# Patient Record
Sex: MALE | Race: WHITE | Employment: FULL TIME | URBAN - METROPOLITAN AREA
[De-identification: names, ages, dates, MRNs, and addresses within clinical notes are randomized per-mention and may not be internally consistent; named-entity substitution may affect disease eponyms.]

---

## 2017-05-09 ENCOUNTER — APPOINTMENT (OUTPATIENT)
Dept: CT IMAGING | Facility: HOSPITAL | Age: 47
End: 2017-05-09
Attending: EMERGENCY MEDICINE
Payer: COMMERCIAL

## 2017-05-09 ENCOUNTER — HOSPITAL ENCOUNTER (EMERGENCY)
Facility: HOSPITAL | Age: 47
Discharge: HOME | End: 2017-05-09
Attending: EMERGENCY MEDICINE
Payer: COMMERCIAL

## 2017-05-09 ENCOUNTER — APPOINTMENT (OUTPATIENT)
Dept: GENERAL RADIOLOGY | Facility: HOSPITAL | Age: 47
End: 2017-05-09
Attending: EMERGENCY MEDICINE
Payer: COMMERCIAL

## 2017-05-09 VITALS
SYSTOLIC BLOOD PRESSURE: 103 MMHG | TEMPERATURE: 98 F | HEART RATE: 96 BPM | WEIGHT: 200 LBS | RESPIRATION RATE: 18 BRPM | BODY MASS INDEX: 30 KG/M2 | DIASTOLIC BLOOD PRESSURE: 62 MMHG | OXYGEN SATURATION: 98 %

## 2017-05-09 DIAGNOSIS — R55 SYNCOPE AND COLLAPSE: Primary | ICD-10-CM

## 2017-05-09 DIAGNOSIS — I69.398 CVA, OLD, DISTURBANCES OF VISION: ICD-10-CM

## 2017-05-09 DIAGNOSIS — H53.9 CVA, OLD, DISTURBANCES OF VISION: ICD-10-CM

## 2017-05-09 DIAGNOSIS — S02.2XXA CLOSED FRACTURE OF NASAL BONE, INITIAL ENCOUNTER: ICD-10-CM

## 2017-05-09 PROCEDURE — 96372 THER/PROPH/DIAG INJ SC/IM: CPT

## 2017-05-09 PROCEDURE — 99285 EMERGENCY DEPT VISIT HI MDM: CPT

## 2017-05-09 PROCEDURE — 70486 CT MAXILLOFACIAL W/O DYE: CPT | Performed by: EMERGENCY MEDICINE

## 2017-05-09 PROCEDURE — 71020 XR CHEST PA + LAT CHEST (CPT=71020): CPT | Performed by: EMERGENCY MEDICINE

## 2017-05-09 PROCEDURE — 80053 COMPREHEN METABOLIC PANEL: CPT | Performed by: EMERGENCY MEDICINE

## 2017-05-09 PROCEDURE — 93005 ELECTROCARDIOGRAM TRACING: CPT

## 2017-05-09 PROCEDURE — 21310 PR CLOSED TX NASAL BONE FX WITHOUT MANIPULATION: CPT

## 2017-05-09 PROCEDURE — 72170 X-RAY EXAM OF PELVIS: CPT | Performed by: EMERGENCY MEDICINE

## 2017-05-09 PROCEDURE — 70450 CT HEAD/BRAIN W/O DYE: CPT | Performed by: EMERGENCY MEDICINE

## 2017-05-09 PROCEDURE — 93010 ELECTROCARDIOGRAM REPORT: CPT

## 2017-05-09 PROCEDURE — 73562 X-RAY EXAM OF KNEE 3: CPT | Performed by: EMERGENCY MEDICINE

## 2017-05-09 PROCEDURE — 73560 X-RAY EXAM OF KNEE 1 OR 2: CPT | Performed by: EMERGENCY MEDICINE

## 2017-05-09 PROCEDURE — 84484 ASSAY OF TROPONIN QUANT: CPT | Performed by: EMERGENCY MEDICINE

## 2017-05-09 PROCEDURE — 82962 GLUCOSE BLOOD TEST: CPT

## 2017-05-09 PROCEDURE — 21310 HC CLOSED TX NASAL BONE FX WITHOUT MANIPULATION: CPT

## 2017-05-09 PROCEDURE — 36415 COLL VENOUS BLD VENIPUNCTURE: CPT

## 2017-05-09 PROCEDURE — 76377 3D RENDER W/INTRP POSTPROCES: CPT | Performed by: EMERGENCY MEDICINE

## 2017-05-09 PROCEDURE — 85025 COMPLETE CBC W/AUTO DIFF WBC: CPT | Performed by: EMERGENCY MEDICINE

## 2017-05-09 RX ORDER — HYDROCODONE BITARTRATE AND ACETAMINOPHEN 5; 325 MG/1; MG/1
1 TABLET ORAL ONCE
Status: COMPLETED | OUTPATIENT
Start: 2017-05-09 | End: 2017-05-09

## 2017-05-09 RX ORDER — AMOXICILLIN AND CLAVULANATE POTASSIUM 875; 125 MG/1; MG/1
1 TABLET, FILM COATED ORAL 2 TIMES DAILY
Qty: 14 TABLET | Refills: 0 | Status: SHIPPED | OUTPATIENT
Start: 2017-05-09 | End: 2017-05-16

## 2017-05-09 RX ORDER — HYDROCODONE BITARTRATE AND ACETAMINOPHEN 5; 325 MG/1; MG/1
1-2 TABLET ORAL EVERY 6 HOURS PRN
Qty: 20 TABLET | Refills: 0 | Status: SHIPPED | OUTPATIENT
Start: 2017-05-09 | End: 2017-05-16

## 2017-05-09 NOTE — ED INITIAL ASSESSMENT (HPI)
46YM c/c of syncope Pt state he had two syncopal events tonight that started 0000 Pt state he fall and hit his face on during one events.  Pt wife state pt was \"out of it\" short after the event then got better

## 2017-05-09 NOTE — ED PROVIDER NOTES
Patient Seen in: [facility] Emergency Department    History   Patient presents with:  Syncope (cardiovascular, neurologic)  Facial Trauma    Stated Complaint: syncopal event, face inj    HPI    The patient is a 18-year-old male with multiple past me Solution Pen-injector,  INJECT 40 UNITS INTO THE SKIN DAILY. Amphetamine-Dextroamphet ER (ADDERALL XR) 10 MG Oral Capsule SR 24 Hr,  Take 1 capsule (10 mg total) by mouth daily.    Amphetamine-Dextroamphet ER (ADDERALL XR) 10 MG Oral Capsule SR 24 Hr,  Ta 0136 100   Resp 05/09/17 0136 20   Temp 05/09/17 0502 98 °F (36.7 °C)   Temp src 05/09/17 0502 Temporal   SpO2 05/09/17 0136 100 %   O2 Device 05/09/17 0136 None (Room air)       Current:/62 mmHg  Pulse 96  Temp(Src) 98 °F (36.7 °C) (Temporal)  Resp sensation is intact. Distal pulses normal and symmetric  Skin: No lacerations or abrasions. No masses or nodules or abnormalities.   Psych: Normal interaction, cooperative with exam    ED Course     Labs Reviewed   COMP METABOLIC PANEL (14) - Abnormal; No x-ray, left knee x-ray was obtained. The remainder of his exam was cleared clinically. Tetanus was updated.   MDM       XR chest, pelvis, left knee  IMPRESSION:  Chest:  -No evidence of pulmonary infiltrates or pleural effusions  -Mild enlargement of the department on telemetry and has been in normal sinus rhythm. His EKG, labs and chest x-ray show no acute process.   I believe he is stable for discharge with follow-up with his cardiologist.  He understands he should return if he develops worrisome symptom

## 2018-01-22 PROBLEM — Z79.4 TYPE 2 DIABETES MELLITUS WITHOUT COMPLICATION, WITH LONG-TERM CURRENT USE OF INSULIN (HCC): Status: ACTIVE | Noted: 2018-01-22

## 2018-01-22 PROBLEM — E78.1 HYPERTRIGLYCERIDEMIA: Status: ACTIVE | Noted: 2018-01-22

## 2018-01-22 PROBLEM — E11.9 TYPE 2 DIABETES MELLITUS WITHOUT COMPLICATION, WITH LONG-TERM CURRENT USE OF INSULIN (HCC): Status: ACTIVE | Noted: 2018-01-22

## 2018-03-31 PROCEDURE — 83721 ASSAY OF BLOOD LIPOPROTEIN: CPT | Performed by: INTERNAL MEDICINE

## 2018-05-24 PROCEDURE — 82140 ASSAY OF AMMONIA: CPT | Performed by: OTHER

## 2018-05-24 PROCEDURE — 36415 COLL VENOUS BLD VENIPUNCTURE: CPT | Performed by: OTHER

## 2018-05-24 PROCEDURE — 82607 VITAMIN B-12: CPT | Performed by: OTHER

## 2018-12-03 ENCOUNTER — HOSPITAL ENCOUNTER (OUTPATIENT)
Dept: GENERAL RADIOLOGY | Age: 48
Discharge: HOME | End: 2018-12-03
Attending: INTERNAL MEDICINE
Payer: COMMERCIAL

## 2018-12-03 DIAGNOSIS — R07.9 CHEST PAIN, UNSPECIFIED TYPE: ICD-10-CM

## 2018-12-03 PROCEDURE — 71046 X-RAY EXAM CHEST 2 VIEWS: CPT | Performed by: INTERNAL MEDICINE

## 2018-12-11 ENCOUNTER — APPOINTMENT (OUTPATIENT)
Dept: GENERAL RADIOLOGY | Facility: HOSPITAL | Age: 48
End: 2018-12-11
Payer: COMMERCIAL

## 2018-12-11 ENCOUNTER — APPOINTMENT (OUTPATIENT)
Dept: CT IMAGING | Facility: HOSPITAL | Age: 48
End: 2018-12-11
Attending: EMERGENCY MEDICINE
Payer: COMMERCIAL

## 2018-12-11 ENCOUNTER — HOSPITAL ENCOUNTER (OUTPATIENT)
Facility: HOSPITAL | Age: 48
Setting detail: OBSERVATION
Discharge: HOME | End: 2018-12-12
Attending: EMERGENCY MEDICINE | Admitting: INTERNAL MEDICINE
Payer: COMMERCIAL

## 2018-12-11 DIAGNOSIS — J18.9 COMMUNITY ACQUIRED PNEUMONIA, UNSPECIFIED LATERALITY: ICD-10-CM

## 2018-12-11 DIAGNOSIS — R00.0 TACHYCARDIA: ICD-10-CM

## 2018-12-11 DIAGNOSIS — R07.9 CHEST PAIN OF UNCERTAIN ETIOLOGY: Primary | ICD-10-CM

## 2018-12-11 PROCEDURE — 96365 THER/PROPH/DIAG IV INF INIT: CPT

## 2018-12-11 PROCEDURE — 83880 ASSAY OF NATRIURETIC PEPTIDE: CPT | Performed by: EMERGENCY MEDICINE

## 2018-12-11 PROCEDURE — 82962 GLUCOSE BLOOD TEST: CPT

## 2018-12-11 PROCEDURE — 99285 EMERGENCY DEPT VISIT HI MDM: CPT

## 2018-12-11 PROCEDURE — 71045 X-RAY EXAM CHEST 1 VIEW: CPT

## 2018-12-11 PROCEDURE — 96361 HYDRATE IV INFUSION ADD-ON: CPT

## 2018-12-11 PROCEDURE — 85025 COMPLETE CBC W/AUTO DIFF WBC: CPT

## 2018-12-11 PROCEDURE — 93010 ELECTROCARDIOGRAM REPORT: CPT

## 2018-12-11 PROCEDURE — 84484 ASSAY OF TROPONIN QUANT: CPT

## 2018-12-11 PROCEDURE — 83036 HEMOGLOBIN GLYCOSYLATED A1C: CPT | Performed by: HOSPITALIST

## 2018-12-11 PROCEDURE — 80053 COMPREHEN METABOLIC PANEL: CPT

## 2018-12-11 PROCEDURE — 71275 CT ANGIOGRAPHY CHEST: CPT | Performed by: EMERGENCY MEDICINE

## 2018-12-11 PROCEDURE — 96375 TX/PRO/DX INJ NEW DRUG ADDON: CPT

## 2018-12-11 PROCEDURE — 93005 ELECTROCARDIOGRAM TRACING: CPT

## 2018-12-11 PROCEDURE — 85378 FIBRIN DEGRADE SEMIQUANT: CPT | Performed by: EMERGENCY MEDICINE

## 2018-12-11 RX ORDER — BISACODYL 10 MG
10 SUPPOSITORY, RECTAL RECTAL
Status: DISCONTINUED | OUTPATIENT
Start: 2018-12-11 | End: 2018-12-12

## 2018-12-11 RX ORDER — FAMOTIDINE 20 MG
5000 TABLET ORAL DAILY
COMMUNITY

## 2018-12-11 RX ORDER — IBUPROFEN 400 MG/1
200 TABLET ORAL EVERY 4 HOURS PRN
Status: DISCONTINUED | OUTPATIENT
Start: 2018-12-11 | End: 2018-12-12

## 2018-12-11 RX ORDER — CEFUROXIME AXETIL 500 MG/1
500 TABLET ORAL 2 TIMES DAILY
Status: DISCONTINUED | OUTPATIENT
Start: 2018-12-11 | End: 2018-12-12

## 2018-12-11 RX ORDER — PREDNISONE 10 MG/1
40 TABLET ORAL SEE ADMIN INSTRUCTIONS
COMMUNITY
Start: 2018-12-10 | End: 2018-12-12

## 2018-12-11 RX ORDER — PANTOPRAZOLE SODIUM 40 MG/1
40 TABLET, DELAYED RELEASE ORAL
Status: DISCONTINUED | OUTPATIENT
Start: 2018-12-12 | End: 2018-12-12

## 2018-12-11 RX ORDER — ALBUTEROL SULFATE 90 UG/1
2 AEROSOL, METERED RESPIRATORY (INHALATION) EVERY 4 HOURS PRN
Status: DISCONTINUED | OUTPATIENT
Start: 2018-12-11 | End: 2018-12-12

## 2018-12-11 RX ORDER — ONDANSETRON 2 MG/ML
4 INJECTION INTRAMUSCULAR; INTRAVENOUS EVERY 6 HOURS PRN
Status: DISCONTINUED | OUTPATIENT
Start: 2018-12-11 | End: 2018-12-12

## 2018-12-11 RX ORDER — DEXTROAMPHETAMINE SACCHARATE, AMPHETAMINE ASPARTATE, DEXTROAMPHETAMINE SULFATE AND AMPHETAMINE SULFATE 1.25; 1.25; 1.25; 1.25 MG/1; MG/1; MG/1; MG/1
5 TABLET ORAL
COMMUNITY

## 2018-12-11 RX ORDER — DEXTROSE MONOHYDRATE 25 G/50ML
50 INJECTION, SOLUTION INTRAVENOUS
Status: DISCONTINUED | OUTPATIENT
Start: 2018-12-11 | End: 2018-12-12

## 2018-12-11 RX ORDER — LANSOPRAZOLE 30 MG/1
30 CAPSULE, DELAYED RELEASE ORAL
COMMUNITY
End: 2019-04-14

## 2018-12-11 RX ORDER — ENOXAPARIN SODIUM 100 MG/ML
40 INJECTION SUBCUTANEOUS NIGHTLY
Status: DISCONTINUED | OUTPATIENT
Start: 2018-12-11 | End: 2018-12-12

## 2018-12-11 RX ORDER — ACETAMINOPHEN 325 MG/1
650 TABLET ORAL EVERY 6 HOURS PRN
Status: DISCONTINUED | OUTPATIENT
Start: 2018-12-11 | End: 2018-12-12

## 2018-12-11 RX ORDER — METOCLOPRAMIDE HYDROCHLORIDE 5 MG/ML
10 INJECTION INTRAMUSCULAR; INTRAVENOUS EVERY 8 HOURS PRN
Status: DISCONTINUED | OUTPATIENT
Start: 2018-12-11 | End: 2018-12-12

## 2018-12-11 RX ORDER — GABAPENTIN 100 MG/1
200 CAPSULE ORAL 3 TIMES DAILY
Status: DISCONTINUED | OUTPATIENT
Start: 2018-12-11 | End: 2018-12-12

## 2018-12-11 RX ORDER — PREDNISONE 10 MG/1
30 TABLET ORAL SEE ADMIN INSTRUCTIONS
COMMUNITY
Start: 2018-12-13 | End: 2018-12-15

## 2018-12-11 RX ORDER — VENLAFAXINE HYDROCHLORIDE 150 MG/1
112.5 TABLET, EXTENDED RELEASE ORAL DAILY
COMMUNITY
End: 2019-07-01

## 2018-12-11 RX ORDER — POLYETHYLENE GLYCOL 3350 17 G/17G
17 POWDER, FOR SOLUTION ORAL DAILY PRN
Status: DISCONTINUED | OUTPATIENT
Start: 2018-12-11 | End: 2018-12-12

## 2018-12-11 RX ORDER — IBUPROFEN 400 MG/1
400 TABLET ORAL EVERY 4 HOURS PRN
Status: DISCONTINUED | OUTPATIENT
Start: 2018-12-11 | End: 2018-12-12

## 2018-12-11 RX ORDER — ROSUVASTATIN CALCIUM 5 MG/1
5 TABLET, COATED ORAL NIGHTLY
COMMUNITY
End: 2019-04-14

## 2018-12-11 RX ORDER — DOCUSATE SODIUM 100 MG/1
100 CAPSULE, LIQUID FILLED ORAL 2 TIMES DAILY
Status: DISCONTINUED | OUTPATIENT
Start: 2018-12-11 | End: 2018-12-12

## 2018-12-11 RX ORDER — PREDNISONE 10 MG/1
20 TABLET ORAL SEE ADMIN INSTRUCTIONS
COMMUNITY
Start: 2018-12-16 | End: 2018-12-18

## 2018-12-11 RX ORDER — PREDNISONE 10 MG/1
10 TABLET ORAL SEE ADMIN INSTRUCTIONS
COMMUNITY
Start: 2018-12-19 | End: 2018-12-21

## 2018-12-11 RX ORDER — OMEGA-3-ACID ETHYL ESTERS 1 G/1
2 CAPSULE, LIQUID FILLED ORAL 2 TIMES DAILY
COMMUNITY
End: 2019-04-12 | Stop reason: CLARIF

## 2018-12-11 RX ORDER — VENLAFAXINE HYDROCHLORIDE 37.5 MG/1
112.5 CAPSULE, EXTENDED RELEASE ORAL DAILY
COMMUNITY
End: 2019-04-12

## 2018-12-11 RX ORDER — SODIUM CHLORIDE 9 MG/ML
INJECTION, SOLUTION INTRAVENOUS CONTINUOUS
Status: ACTIVE | OUTPATIENT
Start: 2018-12-11 | End: 2018-12-11

## 2018-12-11 RX ORDER — GABAPENTIN 100 MG/1
200 CAPSULE ORAL 3 TIMES DAILY
COMMUNITY

## 2018-12-11 RX ORDER — SODIUM PHOSPHATE, DIBASIC AND SODIUM PHOSPHATE, MONOBASIC 7; 19 G/133ML; G/133ML
1 ENEMA RECTAL ONCE AS NEEDED
Status: DISCONTINUED | OUTPATIENT
Start: 2018-12-11 | End: 2018-12-12

## 2018-12-11 RX ORDER — IBUPROFEN 600 MG/1
600 TABLET ORAL EVERY 4 HOURS PRN
Status: DISCONTINUED | OUTPATIENT
Start: 2018-12-11 | End: 2018-12-12

## 2018-12-11 RX ORDER — ROSUVASTATIN CALCIUM 5 MG/1
5 TABLET, COATED ORAL NIGHTLY
Status: DISCONTINUED | OUTPATIENT
Start: 2018-12-11 | End: 2018-12-12

## 2018-12-11 NOTE — ED PROVIDER NOTES
Patient Seen in: [facility] Emergency Department    History   Patient presents with:  Chest Pain Angina (cardiovascular)  Dyspnea HERSON SOB (respiratory)    Stated Complaint: CHEST PAIN    HPI    43-year-old male presents with chest pain.   He reports vital signs reviewed. All other systems reviewed and negative except as noted above.     Physical Exam     ED Triage Vitals [12/11/18 1448]   /70   Pulse 114   Resp 20   Temp 98 °F (36.7 °C)   Temp src    SpO2 97 %   O2 Device None (Room air) approximately 95% when results are used as part of the total clinical evaluation of the patient. CBC WITH DIFFERENTIAL WITH PLATELET    Narrative: The following orders were created for panel order CBC WITH DIFFERENTIAL WITH PLATELET.   Procedure CT ANGIOGRAPHY, CHEST (CPT=71275)  COMPARISON:  [facility]HAEL , CT FACIAL BONES (CPT=70486), 5/09/2017, 3:17. MICHAEL , CT BRAIN OR HEAD (52627), 5/09/2017, 3:13.   INDICATIONS:  CHEST PAIN  TECHNIQUE:  IV contrast-enhanced multislice CT angiography is performed t right brachiocephalic artery measuring 1.9 cm. 4.  Cholelithiasis.     Dictated by: Grisel Stanton MD on 12/11/2018 at 16:53     Approved by: Grisel Stanton MD                              MDM   30-year-old male with history of previous aortic valve rep

## 2018-12-11 NOTE — ED INITIAL ASSESSMENT (HPI)
Pt here with c/o chest pain, tightness, HERSON, recent dx of PNA. Patient reports today upon wakening the chest pain was more severe, pain worsens on exertion with dyspnea. Patient went to urgent care, was sent over via 911.  Patient reports mild relief with 1

## 2018-12-12 ENCOUNTER — APPOINTMENT (OUTPATIENT)
Dept: CV DIAGNOSTICS | Facility: HOSPITAL | Age: 48
End: 2018-12-12
Attending: INTERNAL MEDICINE
Payer: COMMERCIAL

## 2018-12-12 VITALS
SYSTOLIC BLOOD PRESSURE: 105 MMHG | TEMPERATURE: 98 F | OXYGEN SATURATION: 100 % | HEART RATE: 88 BPM | BODY MASS INDEX: 30.36 KG/M2 | DIASTOLIC BLOOD PRESSURE: 78 MMHG | RESPIRATION RATE: 20 BRPM | HEIGHT: 68 IN | WEIGHT: 200.31 LBS

## 2018-12-12 PROCEDURE — 87633 RESP VIRUS 12-25 TARGETS: CPT | Performed by: INTERNAL MEDICINE

## 2018-12-12 PROCEDURE — 80048 BASIC METABOLIC PNL TOTAL CA: CPT | Performed by: INTERNAL MEDICINE

## 2018-12-12 PROCEDURE — 84145 PROCALCITONIN (PCT): CPT | Performed by: INTERNAL MEDICINE

## 2018-12-12 PROCEDURE — 87040 BLOOD CULTURE FOR BACTERIA: CPT | Performed by: INTERNAL MEDICINE

## 2018-12-12 PROCEDURE — 87999 UNLISTED MICROBIOLOGY PX: CPT

## 2018-12-12 PROCEDURE — 87486 CHLMYD PNEUM DNA AMP PROBE: CPT | Performed by: INTERNAL MEDICINE

## 2018-12-12 PROCEDURE — 87581 M.PNEUMON DNA AMP PROBE: CPT | Performed by: INTERNAL MEDICINE

## 2018-12-12 PROCEDURE — 93306 TTE W/DOPPLER COMPLETE: CPT | Performed by: INTERNAL MEDICINE

## 2018-12-12 PROCEDURE — 93018 CV STRESS TEST I&R ONLY: CPT | Performed by: INTERNAL MEDICINE

## 2018-12-12 PROCEDURE — 82962 GLUCOSE BLOOD TEST: CPT

## 2018-12-12 PROCEDURE — 84484 ASSAY OF TROPONIN QUANT: CPT | Performed by: INTERNAL MEDICINE

## 2018-12-12 PROCEDURE — 86140 C-REACTIVE PROTEIN: CPT | Performed by: INTERNAL MEDICINE

## 2018-12-12 PROCEDURE — 93017 CV STRESS TEST TRACING ONLY: CPT | Performed by: INTERNAL MEDICINE

## 2018-12-12 PROCEDURE — 78452 HT MUSCLE IMAGE SPECT MULT: CPT | Performed by: INTERNAL MEDICINE

## 2018-12-12 PROCEDURE — 85652 RBC SED RATE AUTOMATED: CPT | Performed by: INTERNAL MEDICINE

## 2018-12-12 PROCEDURE — 85025 COMPLETE CBC W/AUTO DIFF WBC: CPT | Performed by: INTERNAL MEDICINE

## 2018-12-12 PROCEDURE — 87798 DETECT AGENT NOS DNA AMP: CPT | Performed by: INTERNAL MEDICINE

## 2018-12-12 RX ORDER — CEFUROXIME AXETIL 500 MG/1
500 TABLET ORAL 2 TIMES DAILY
Qty: 20 TABLET | Refills: 0 | Status: SHIPPED | OUTPATIENT
Start: 2018-12-12 | End: 2018-12-22

## 2018-12-12 RX ORDER — POTASSIUM CHLORIDE 20 MEQ/1
40 TABLET, EXTENDED RELEASE ORAL EVERY 4 HOURS
Status: COMPLETED | OUTPATIENT
Start: 2018-12-12 | End: 2018-12-12

## 2018-12-12 NOTE — PLAN OF CARE
Assumed care of patient at 1100 which at this time he was down in stress lab.       1500: pt returned back to room from stress lab. RT swabbed for r/o flu.     1715: Dr. Shahid Dean notified me that stress test and echo were negative.  He was ok that the brandon

## 2018-12-12 NOTE — H&P
[facility] Hospitalist History and Physical      Patient presents with:  Chest Pain Angina (cardiovascular)  Dyspnea HERSON SOB (respiratory)     Pt seen in ER 12/11, note entered late      PCP: Fred Miller MD      History of Present Illness: Patient is a 53 year ol Normocephalic, without obvious abnormality, atraumatic. Eyes:  Sclera anicteric, No conjunctival pallor, EOMs intact. Nose: Nares normal. Septum midline. Mucosa normal. No drainage.    Throat: Lips, mucosa, and tongue normal. Teeth and gums normal.   N inspiration. 11 mm nodular opacity projected over left midlung fields. Underlying pulmonary nodule cannot be excluded. CARDIAC:  Heart size upper limits of normal likely accentuated by shallow inspiration.   Postsurgical changes cardiac valve MEDIASTINUM: Normal. CARDIAC:  Coronary artery calcifications. CHEST WALL:  Normal. LIMITED ABDOMEN:  Hepatic steatosis. Cholelithiasis BONES:  Median sternotomy approximated by wire sutures. Degenerative changes of the thoracic spine. CONCLUSION:  1.   No acute Status post aortic valve replacement and aortic root repair. 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  PULMONARY ARTERIES:Normal. MEDIASTINUM/DESHAWN:  Normal. TRACHEA:Normal. ESOPHAGUS:Normal. CHEST WALL:  Normal. AXIL continue to follow patient while in house  A total of 75  minutes taken with patient and coordinating care. Greater than 50% face to face encounter.       Tamiko Cornell MD  [facility] Hospitalist  [phone]

## 2018-12-12 NOTE — PROGRESS NOTES
[facility] Hospitalist Progress Note                                                                   [address]  5/12/1970    SUBJECTIVE:  Underwent lexiscan this afternoon, ibuprofen **OR** ibuprofen, ondansetron HCl, Metoclopramide HCl, Albuterol Sulfate HFA, glucose **OR** Glucose-Vitamin C **OR** dextrose **OR** glucose **OR** Glucose-Vitamin C    Assessment/Plan:  Principal Problem:    Chest pain of uncertain etiology  Ac

## 2018-12-12 NOTE — PLAN OF CARE
NURSING ADMISSION NOTE    RECEIVED PATIENT TO CTU2 - 1903 AROUND 1900. Patient is ANOx4,on RA, tele ST, cont B/B, UAL. Orthostatics negative admission. Patient admitted for ROPER + Chest discomfort/pain.  Patient describes pain as a dull tightness that fe room.  Safety precautions initiated. Bed in low position. Call light in reach. Will continue to monitor.     ANXIETY    • Will report anxiety at manageable levels Progressing        CARDIOVASCULAR - ADULT    • Maintains optimal cardiac output and hemod

## 2018-12-12 NOTE — CONSULTS
[facility] Cardiology Consultation    Shaheed Chun.  Patient Status:  Observation    1970 MRN [id number]   [facility] 2NE-A Attending Codie Morales MD   Hosp Day # 0 PCP Ezra Guillen MD     Reason for Consultation:  Pr Liraglutide  1.8 mg Subcutaneous Daily   • Rosuvastatin Calcium  5 mg Oral Nightly   • Venlafaxine HCl ER  112.5 mg Oral Daily   • Venlafaxine HCl ER  112.5 mg Oral Daily   • Insulin Aspart Pen  1-5 Units Subcutaneous TID CC and HS       Continuous Infusio May be pericardial origin. 2. BioAVR 2008 due to bicuspid AV. 3. Abnormal CT chest with JUAN CARLOS pulmonary nodules. 4. Diabetes  5. HL  6.  ADD/depression    Plan:  Echo and cardiolite today  ESR, CRP  Await hospitalist.  Await pulmonary eval due to abnormal

## 2018-12-12 NOTE — BH PROGRESS NOTE
Went to see the pt for f/u with phq4. The pt states he has a history of anxiety and being in the hospital doesn't help that. He said he already sees Sam YANEZ with [facility].   He said, he also has been seeing a therapist but doesn't

## 2018-12-12 NOTE — PROGRESS NOTES
ADMISSION ORTHOSTATICS         12/11/18 1930 12/11/18 1931 12/11/18 1932   Vital Signs   Pulse 110 114 115   Heart Rate Source Monitor Monitor Monitor   Resp 18 20 20   Respiratory Quality Normal Normal Normal   /63 117/72 113/68   BP Location Left a

## 2018-12-12 NOTE — CONSULTS
Pulmonary H&P/Consult       NAME: Frank Mccallum. - ROOM: [address] [place] [address] - MRN: [id number] - Age: 53year old - :  1970    Date of Admission: 2018  2:36 PM  Admission Diagnosis: Tachycardia [R00.0]  Chest pain of uncertain etiolog amphetamine-dextroamphetamine 5 MG Oral Tab Take 5 mg by mouth Noon. Only on working days Disp:  Rfl:  Past Week at 1200   Vitamin D, Cholecalciferol, 1000 units Oral Cap Take 5,000 Units by mouth daily.  Disp:  Rfl:  12/10/2018 at 0900   gabapentin 100 M HCl-Metformin HCl  MG Oral Tab Take 1 tablet by mouth 2 (two) times daily with meals. Disp: 180 tablet Rfl: 1 12/11/2018 at 0900   insulin glargine (LANTUS SOLOSTAR) 100 UNIT/ML Subcutaneous Solution Pen-injector Inject 20 Units into the skin daily. on file         Family History:  Family History   Problem Relation Age of Onset   • Neurological Disorder Neg         Home Medications:    Outpatient Medications Marked as Taking for the 12/11/18 encounter [facility] Encounter):  Liraglutide (VICTOZA) 18 MG 2 sprays by Nasal route daily. Disp: 3 Bottle Rfl: 3   Pioglitazone HCl-Metformin HCl  MG Oral Tab Take 1 tablet by mouth 2 (two) times daily with meals.  Disp: 180 tablet Rfl: 1   insulin glargine (LANTUS SOLOSTAR) 100 UNIT/ML Subcutaneous Solution P lb 12.8 oz (89.3 kg)        Intake/Output Summary (Last 24 hours) at 12/12/2018 0923  Last data filed at 12/12/2018 0815  Gross per 24 hour   Intake 1040 ml   Output 1000 ml   Net 40 ml       /75 (BP Location: Left arm)   Pulse 82   Temp 98.3 °F (36. as noted above    Echo: EF 70%. Bioprosthetic av. rvsp 31    ASSESSMENT/PLAN:    1.  Chest Pain  -pleurisy vs pleural irritation from left sided nodule  -pain meds/anti-inflammatories per IM  -further w/u per cards/IM  -should complete course of steroids th

## 2019-04-12 PROBLEM — R07.9 CHEST PAIN OF UNCERTAIN ETIOLOGY: Status: RESOLVED | Noted: 2018-12-11 | Resolved: 2019-04-12

## 2019-04-12 PROBLEM — R00.0 TACHYCARDIA: Status: RESOLVED | Noted: 2018-12-11 | Resolved: 2019-04-12

## 2019-04-12 PROBLEM — J18.9 COMMUNITY ACQUIRED PNEUMONIA, UNSPECIFIED LATERALITY: Status: RESOLVED | Noted: 2018-12-11 | Resolved: 2019-04-12
